# Patient Record
Sex: FEMALE | Race: OTHER | NOT HISPANIC OR LATINO | Employment: UNEMPLOYED | URBAN - METROPOLITAN AREA
[De-identification: names, ages, dates, MRNs, and addresses within clinical notes are randomized per-mention and may not be internally consistent; named-entity substitution may affect disease eponyms.]

---

## 2023-07-15 ENCOUNTER — HOSPITAL ENCOUNTER (EMERGENCY)
Facility: HOSPITAL | Age: 53
Discharge: HOME | End: 2023-07-15
Attending: INTERNAL MEDICINE
Payer: COMMERCIAL

## 2023-07-15 VITALS
HEART RATE: 91 BPM | OXYGEN SATURATION: 99 % | SYSTOLIC BLOOD PRESSURE: 125 MMHG | BODY MASS INDEX: 23.7 KG/M2 | HEIGHT: 69 IN | RESPIRATION RATE: 20 BRPM | TEMPERATURE: 98 F | WEIGHT: 160 LBS | DIASTOLIC BLOOD PRESSURE: 85 MMHG

## 2023-07-15 DIAGNOSIS — Z13.9 ENCOUNTER FOR MEDICAL SCREENING EXAMINATION: Primary | ICD-10-CM

## 2023-07-15 DIAGNOSIS — Z86.59 HISTORY OF PSYCHIATRIC HOSPITALIZATION: ICD-10-CM

## 2023-07-15 PROCEDURE — 99282 EMERGENCY DEPT VISIT SF MDM: CPT

## 2023-07-15 NOTE — ED PROVIDER NOTES
Encounter Date: 7/15/2023       History     Chief Complaint   Patient presents with    Psychiatric Evaluation     OPC     OPC by her daughter due to paranoid delusions.  Pt states she have not seen her daughter in months and she used some event from last month to OPC her. Patient admits been paranoid and having a psychotic breakdown a month ago for which she received psychiatric management in [place] last month. During that episode she thought her  was poisoning her and that someone was watching her but all those paranoid delusions are not more after therapy. States she is doing a lot better with her  and that she trust him and both are in marriage therapy.  came to the ED and confirm his wife's story, he states she is doing better with few episodes that she is able to control. States he feels safe with her at home and she is not a threat to her or others.     The history is provided by the patient and the spouse.   Review of patient's allergies indicates:   Allergen Reactions    Abilify [aripiprazole]     Penicillins     Zofran [ondansetron hcl]      History reviewed. No pertinent past medical history.  History reviewed. No pertinent surgical history.  History reviewed. No pertinent family history.     Review of Systems   Constitutional:  Negative for fever.   HENT:  Negative for sore throat.    Respiratory:  Negative for shortness of breath.    Cardiovascular:  Negative for chest pain.   Gastrointestinal:  Negative for nausea.   Genitourinary:  Negative for dysuria.   Musculoskeletal:  Negative for back pain.   Skin:  Negative for rash.   Neurological:  Negative for weakness.   Hematological:  Does not bruise/bleed easily.   All other systems reviewed and are negative.    Physical Exam     Initial Vitals [07/15/23 0732]   BP Pulse Resp Temp SpO2   125/85 91 20 98.3 °F (36.8 °C) 99 %      MAP       --         Physical Exam    Nursing note and vitals reviewed.  Constitutional: She appears  well-developed and well-nourished. No distress.   HENT:   Head: Normocephalic and atraumatic.   Mouth/Throat: Oropharynx is clear and moist.   Eyes: Conjunctivae and EOM are normal. Pupils are equal, round, and reactive to light.   Neck: Neck supple.   Normal range of motion.  Cardiovascular:  Normal rate, regular rhythm, normal heart sounds and intact distal pulses.           Pulmonary/Chest: Breath sounds normal.   Abdominal: Abdomen is soft. Bowel sounds are normal. She exhibits no distension. There is no abdominal tenderness. There is no rebound and no guarding.   Musculoskeletal:         General: No edema. Normal range of motion.      Cervical back: Normal range of motion and neck supple.     Neurological: She is alert and oriented to person, place, and time. She has normal strength. GCS score is 15. GCS eye subscore is 4. GCS verbal subscore is 5. GCS motor subscore is 6.   Skin: Skin is warm and dry. No rash noted.   Psychiatric: She has a normal mood and affect. Her speech is normal and behavior is normal. Thought content normal. She is not actively hallucinating. Thought content is not paranoid and not delusional. Cognition and memory are normal. She does not express inappropriate judgment.   At this time pt coherent, no delusions or abnormal behavior.  in the ED confirm improvement after treatment and feeling safe with her at home She is attentive.       ED Course   Procedures  Labs Reviewed - No data to display       Imaging Results    None          Medications - No data to display                           Clinical Impression:   Final diagnoses:  [Z13.9] Encounter for medical screening examination (Primary)  [Z86.59] History of psychiatric hospitalization        ED Disposition Condition    Discharge Stable          ED Prescriptions    None       Follow-up Information       Follow up With Specialties Details Why Contact Info    [facility] Emergency Medicine  If symptoms  [facility] [address] [id number] [phone]    Hammad Westbrook MD Psychiatry Schedule an appointment as soon as possible for a visit in 1 month  [address] [address]  [phone]      [facility] Behavioral Health, Psychiatry, Psychology Schedule an appointment as soon as possible for a visit in 1 month  [address] [place] [address]  [phone]      [facility]  Schedule an appointment as soon as possible for a visit in 2 months  [address] [id number]             Uriel Mendosa MD  07/15/23 0807